# Patient Record
Sex: MALE | Race: WHITE | Employment: FULL TIME | URBAN - METROPOLITAN AREA
[De-identification: names, ages, dates, MRNs, and addresses within clinical notes are randomized per-mention and may not be internally consistent; named-entity substitution may affect disease eponyms.]

---

## 2024-05-22 ENCOUNTER — APPOINTMENT (OUTPATIENT)
Dept: RADIOLOGY | Facility: HOSPITAL | Age: 34
End: 2024-05-22
Payer: COMMERCIAL

## 2024-05-22 ENCOUNTER — HOSPITAL ENCOUNTER (EMERGENCY)
Facility: HOSPITAL | Age: 34
Discharge: HOME | End: 2024-05-22
Payer: COMMERCIAL

## 2024-05-22 VITALS
SYSTOLIC BLOOD PRESSURE: 136 MMHG | BODY MASS INDEX: 29.16 KG/M2 | WEIGHT: 175 LBS | OXYGEN SATURATION: 98 % | DIASTOLIC BLOOD PRESSURE: 78 MMHG | TEMPERATURE: 98.2 F | RESPIRATION RATE: 16 BRPM | HEIGHT: 65 IN | HEART RATE: 90 BPM

## 2024-05-22 DIAGNOSIS — S92.215A CLOSED NONDISPLACED FRACTURE OF CUBOID OF LEFT FOOT, INITIAL ENCOUNTER: Primary | ICD-10-CM

## 2024-05-22 PROCEDURE — 73610 X-RAY EXAM OF ANKLE: CPT | Mod: LEFT SIDE | Performed by: RADIOLOGY

## 2024-05-22 PROCEDURE — 29515 APPLICATION SHORT LEG SPLINT: CPT | Mod: LT

## 2024-05-22 PROCEDURE — 99284 EMERGENCY DEPT VISIT MOD MDM: CPT | Mod: 25

## 2024-05-22 PROCEDURE — 73610 X-RAY EXAM OF ANKLE: CPT | Mod: LT

## 2024-05-22 PROCEDURE — 73630 X-RAY EXAM OF FOOT: CPT | Mod: LEFT SIDE | Performed by: RADIOLOGY

## 2024-05-22 PROCEDURE — 73630 X-RAY EXAM OF FOOT: CPT | Mod: LT

## 2024-05-22 RX ORDER — ACETAMINOPHEN 325 MG/1
650 TABLET ORAL ONCE
Status: COMPLETED | OUTPATIENT
Start: 2024-05-22 | End: 2024-05-22

## 2024-05-22 RX ORDER — NAPROXEN 500 MG/1
500 TABLET ORAL
Qty: 30 TABLET | Refills: 0 | Status: SHIPPED | OUTPATIENT
Start: 2024-05-22 | End: 2024-06-06

## 2024-05-22 RX ADMIN — ACETAMINOPHEN 650 MG: 325 TABLET ORAL at 10:19

## 2024-05-22 ASSESSMENT — LIFESTYLE VARIABLES
HAVE PEOPLE ANNOYED YOU BY CRITICIZING YOUR DRINKING: NO
HAVE YOU EVER FELT YOU SHOULD CUT DOWN ON YOUR DRINKING: NO
EVER FELT BAD OR GUILTY ABOUT YOUR DRINKING: NO
TOTAL SCORE: 0
EVER HAD A DRINK FIRST THING IN THE MORNING TO STEADY YOUR NERVES TO GET RID OF A HANGOVER: NO

## 2024-05-22 ASSESSMENT — COLUMBIA-SUICIDE SEVERITY RATING SCALE - C-SSRS
1. IN THE PAST MONTH, HAVE YOU WISHED YOU WERE DEAD OR WISHED YOU COULD GO TO SLEEP AND NOT WAKE UP?: NO
2. HAVE YOU ACTUALLY HAD ANY THOUGHTS OF KILLING YOURSELF?: NO
6. HAVE YOU EVER DONE ANYTHING, STARTED TO DO ANYTHING, OR PREPARED TO DO ANYTHING TO END YOUR LIFE?: NO

## 2024-05-22 ASSESSMENT — PAIN DESCRIPTION - PAIN TYPE: TYPE: ACUTE PAIN

## 2024-05-22 ASSESSMENT — PAIN SCALES - GENERAL
PAINLEVEL_OUTOF10: 0 - NO PAIN
PAINLEVEL_OUTOF10: 5 - MODERATE PAIN

## 2024-05-22 ASSESSMENT — PAIN - FUNCTIONAL ASSESSMENT
PAIN_FUNCTIONAL_ASSESSMENT: 0-10
PAIN_FUNCTIONAL_ASSESSMENT: 0-10

## 2024-05-22 ASSESSMENT — PAIN DESCRIPTION - LOCATION: LOCATION: ANKLE

## 2024-05-22 ASSESSMENT — PAIN DESCRIPTION - PROGRESSION: CLINICAL_PROGRESSION: RESOLVED

## 2024-05-22 ASSESSMENT — PAIN DESCRIPTION - DESCRIPTORS: DESCRIPTORS: SORE

## 2024-05-22 NOTE — ED TRIAGE NOTES
PT. STATES WAS AT WORK EARLIER TODAY, WENT TO STEP DOWN OFF SOMETHING AND ROLLED LEFT ANKLE, STATES HEARD A POP. PT. C/O PAIN TO LEFT ANKLE AND STATES FEELS PAIN WHEN HE MOVES HIS TOES. PT. AMBULATING WITH SLIGHT LIMP.

## 2024-05-22 NOTE — Clinical Note
Mitchell Cano was seen and treated in our emergency department on 5/22/2024.  He may return to work on 05/29/2024.  Patient is a fracture of his left foot that he is required to be nonweightbearing with.  He is unable to work with his injury.     If you have any questions or concerns, please don't hesitate to call.      Mallika López PA-C

## 2024-05-22 NOTE — ED PROVIDER NOTES
HPI   Chief Complaint   Patient presents with    Ankle Pain     PT. STATES WAS AT WORK EARLIER TODAY, WENT TO STEP DOWN OFF SOMETHING AND ROLLED LEFT ANKLE, STATES HEARD A POP. PT. C/O PAIN TO LEFT ANKLE AND STATES FEELS PAIN WHEN HE MOVES HIS TOES. PT. AMBULATING WITH SLIGHT LIMP.       Mitchell is a 33 y/o male with no pertinent past medical history presenting to the emergency department with a left ankle injury.  Patient states that just prior to arrival, he was at work, and when he stepped down off a ladder, he twisted his left ankle.  The patient did not fall or hit his head.  He is now endorsing left ankle and foot pain.  No prior injury or fracture.  Denies any associated numbness, tingling, or weakness.  He has not taken anything for pain prior to arrival.  He is otherwise feeling fine and denies any fever, chills, nausea, vomiting.                          Tarrs Coma Scale Score: 15                     Patient History   History reviewed. No pertinent past medical history.  History reviewed. No pertinent surgical history.  No family history on file.  Social History     Tobacco Use    Smoking status: Never    Smokeless tobacco: Never   Vaping Use    Vaping status: Never Used   Substance Use Topics    Alcohol use: Not Currently    Drug use: Never       Physical Exam   ED Triage Vitals [05/22/24 0957]   Temperature Heart Rate Respirations BP   36.8 °C (98.2 °F) 90 16 136/78      Pulse Ox Temp Source Heart Rate Source Patient Position   98 % Temporal Monitor Sitting      BP Location FiO2 (%)     Right arm --       Physical Exam  HENT:      Mouth/Throat:      Mouth: Mucous membranes are moist.      Pharynx: Oropharynx is clear.   Eyes:      Extraocular Movements: Extraocular movements intact.   Cardiovascular:      Rate and Rhythm: Normal rate and regular rhythm.      Pulses: Normal pulses.      Heart sounds: Normal heart sounds.   Pulmonary:      Effort: Pulmonary effort is normal.      Breath sounds: Normal  breath sounds.   Abdominal:      General: Abdomen is flat.      Palpations: Abdomen is soft.   Musculoskeletal:         General: Swelling, tenderness and signs of injury present. No deformity. Normal range of motion.      Cervical back: Normal range of motion. No rigidity.      Comments: There is a localized area of swelling overlying the proximal fourth and fifth metatarsal with tenderness to palpation.  No overlying erythema.  Patient does not have any bony tenderness palpation of the ankle or toes.  He has full range of motion and is neurovascular intact distally.   Skin:     Capillary Refill: Capillary refill takes less than 2 seconds.   Neurological:      Mental Status: He is alert.         ED Course & MDM   ED Course as of 05/24/24 1845   Wed May 22, 2024   1111 XR foot left 3+ views  Nondisplaced right cuboid fracture.       [AH]   1111 XR ankle left 3+ views  Nondisplaced right cuboid fracture.       []      ED Course User Index  [] Mallika López PA-C         Diagnoses as of 05/24/24 1845   Closed nondisplaced fracture of cuboid of left foot, initial encounter       Medical Decision Making  Mitchell is a 33 y/o male with no pertinent past medical history presenting to the emergency department with a left ankle injury.  Just prior to arrival, patient was at work when he rolled his left ankle and is now endorsing left ankle and foot pain.    Medical Decision Making: He did not appear ill or toxic.  Vital signs reviewed and stable.  Patient has a localized area of swelling and tenderness palpation overlying the proximal fourth and fifth metatarsal of the left foot.  Workup included x-ray of the left foot and ankle.  He was given Tylenol for pain management.  X-ray imaging demonstrates a nondisplaced right cuboid fracture.  Patient will be placed in a posterior, short leg splint.  Splint is to be kept dry and intact.  Splint was placed by nursing staff.  He was neurovascularly intact pre and post splint  placement.  He will be nonweightbearing and instructed to follow-up with orthopedic surgery for further management of his injury.  If he develops any worsening pain, numbness/tingling of his toes or discoloration of his toes he will be recommended to return to the emergency room.  For pain control, I prescribed him naproxen.  He can also take Tylenol.  Patient was agreeable to this plan and all question concerns were addressed.     Differential diagnoses considered: Fracture, dislocation, soft tissue injury, ligamentous/tendinous injury     Medications given: Tylenol     Diagnosis: Left ankle injury    Plan: Discharge          Procedure  Procedures     Mallika López PA-C  05/24/24 9405

## 2024-05-22 NOTE — DISCHARGE INSTRUCTIONS
You were seen emergency room today for a left foot injury.  An x-ray was taken and shows a broken bone.  You were placed in a splint in the emergency room.  Please keep the splint dry and intact.  You need to follow-up with orthopedics regarding your injury.  A referral was provided to you.  Return the emergency room if you have worsening pain, numbness/tingling or discoloration of your toes.  For pain control, I prescribed you naproxen.  You may also take Tylenol.  I recommend ice within the first 24 to 48 hours followed by heat.  Please also follow-up with her primary care provider regarding her visit to the ED today.